# Patient Record
(demographics unavailable — no encounter records)

---

## 2025-07-21 NOTE — PROCEDURE
[de-identified] : Procedure performed: Nasal Endoscopy- Diagnostic Pre-op indication(s): nasal congestion Post-op indication(s): nasal congestion Verbal and/or written consent obtained from patient Anterior rhinoscopy insufficient to account for symptoms Scope #: 3, flexible fiber optic telescope The scope was introduced in the nasal passage between the middle and inferior turbinates to exam the inferior portion of the middle meatus and the fontanelle, as well as the maxillary ostia. Next, the scope was passed medically and posteriorly to the middle turbinates to examine the sphenoethmoid recess and the superior turbinate region.  Upon visualization the finders are as follows: Nasal Septum: sigmoidal septal deviation Bilateral - Mucosa: boggy turbinates, Mucous: scant, Polyp: not seen, Inferior Turbinate: boggy, Middle Turbinate: normal, Superior Turbinate: normal, Inferior Meatus: narrow, Middle Meatus: narrow, Super Meatus: normal, Sphenoethmoidal Recess: clear thick secretions on L

## 2025-07-21 NOTE — HISTORY OF PRESENT ILLNESS
[de-identified] : 38 year old female presents for evaluation of difficulty breathing through nose for entire life and sinus issues for last month, worse over last few weeks. States has a constant stuffy nose and must breath through mouth. Gets pressure at forehead, nose, and under her eyes for last month. Does get migraines but states currently has pressure throughout her face. Took zpak from PCP for 1 week which did not help, now taking augmentin. Used flonase for 14 days consistently which helped minimally. Denies runny nose. Denies sinus issues prior to 1-2 months ago.  Also with ear pressure and eye pressure.

## 2025-07-21 NOTE — HISTORY OF PRESENT ILLNESS
[de-identified] : 38 year old female presents for evaluation of difficulty breathing through nose for entire life and sinus issues for last month, worse over last few weeks. States has a constant stuffy nose and must breath through mouth. Gets pressure at forehead, nose, and under her eyes for last month. Does get migraines but states currently has pressure throughout her face. Took zpak from PCP for 1 week which did not help, now taking augmentin. Used flonase for 14 days consistently which helped minimally. Denies runny nose. Denies sinus issues prior to 1-2 months ago.  Also with ear pressure and eye pressure.

## 2025-07-21 NOTE — END OF VISIT
[FreeTextEntry3] : I personally saw and examined the patient in detail. I spoke to MC Guerrero regarding the assessment and plan of care.  I preformed the procedures and I reviewed the above assessment and plan of care, and agree. I have made changes in changes in the body of the note where appropriate.

## 2025-07-21 NOTE — ASSESSMENT
[FreeTextEntry1] : 38 year old female presents for evaluation of difficulty breathing through nose and sinus pressure. On exam, R DNS, BITH, thick secretions from L.   Allergy evaluation and possible treatments discussed. Additionally, we will proceed with a regiment of decongestants and medication to reduce inflammation and extended course of antibiotics. The patient was instructed to continue nasal irrigation and topical steroid spray for over 1 month. The goal is to try to break the cycle of inflammation and obstruction leading to resolution of the acute and chronic symptoms. It will hopefully allow for maintenance therapy to reach disease areas and avoid further intervention. - Will start Flonase. A topical steroid reduce mucosal swelling, illustrated appropriate use and how to reduce the risk of bleeding - Nasal irrigation and showed how to use it to maximize effectiveness - follow up in 1 month. 
[FreeTextEntry1] : 38 year old female presents for evaluation of difficulty breathing through nose and sinus pressure. On exam, R DNS, BITH, thick secretions from L.   Allergy evaluation and possible treatments discussed. Additionally, we will proceed with a regiment of decongestants and medication to reduce inflammation and extended course of antibiotics. The patient was instructed to continue nasal irrigation and topical steroid spray for over 1 month. The goal is to try to break the cycle of inflammation and obstruction leading to resolution of the acute and chronic symptoms. It will hopefully allow for maintenance therapy to reach disease areas and avoid further intervention. - Will start Flonase. A topical steroid reduce mucosal swelling, illustrated appropriate use and how to reduce the risk of bleeding - Nasal irrigation and showed how to use it to maximize effectiveness - follow up in 1 month. 
nothing

## 2025-07-21 NOTE — PROCEDURE
[de-identified] : Procedure performed: Nasal Endoscopy- Diagnostic Pre-op indication(s): nasal congestion Post-op indication(s): nasal congestion Verbal and/or written consent obtained from patient Anterior rhinoscopy insufficient to account for symptoms Scope #: 3, flexible fiber optic telescope The scope was introduced in the nasal passage between the middle and inferior turbinates to exam the inferior portion of the middle meatus and the fontanelle, as well as the maxillary ostia. Next, the scope was passed medically and posteriorly to the middle turbinates to examine the sphenoethmoid recess and the superior turbinate region.  Upon visualization the finders are as follows: Nasal Septum: sigmoidal septal deviation Bilateral - Mucosa: boggy turbinates, Mucous: scant, Polyp: not seen, Inferior Turbinate: boggy, Middle Turbinate: normal, Superior Turbinate: normal, Inferior Meatus: narrow, Middle Meatus: narrow, Super Meatus: normal, Sphenoethmoidal Recess: clear thick secretions on L